# Patient Record
Sex: FEMALE | Race: WHITE | NOT HISPANIC OR LATINO | Employment: FULL TIME | URBAN - METROPOLITAN AREA
[De-identification: names, ages, dates, MRNs, and addresses within clinical notes are randomized per-mention and may not be internally consistent; named-entity substitution may affect disease eponyms.]

---

## 2024-07-30 ENCOUNTER — LAB REQUISITION (OUTPATIENT)
Dept: LAB | Facility: CLINIC | Age: 29
End: 2024-07-30
Payer: COMMERCIAL

## 2024-07-30 DIAGNOSIS — E06.3 AUTOIMMUNE THYROIDITIS: ICD-10-CM

## 2024-07-30 DIAGNOSIS — Z36.89 ENCOUNTER FOR OTHER SPECIFIED ANTENATAL SCREENING: ICD-10-CM

## 2024-07-30 LAB
ABO/RH(D): NORMAL
ANTIBODY SCREEN: NEGATIVE
BASOPHILS # BLD AUTO: 0 10E3/UL (ref 0–0.2)
BASOPHILS NFR BLD AUTO: 0 %
EOSINOPHIL # BLD AUTO: 0.1 10E3/UL (ref 0–0.7)
EOSINOPHIL NFR BLD AUTO: 1 %
ERYTHROCYTE [DISTWIDTH] IN BLOOD BY AUTOMATED COUNT: 12.6 % (ref 10–15)
HBA1C MFR BLD: 5.4 %
HBV SURFACE AG SERPL QL IA: NONREACTIVE
HCT VFR BLD AUTO: 42 % (ref 35–47)
HCV AB SERPL QL IA: NONREACTIVE
HGB BLD-MCNC: 14.2 G/DL (ref 11.7–15.7)
HIV 1+2 AB+HIV1 P24 AG SERPL QL IA: NONREACTIVE
IMM GRANULOCYTES # BLD: 0 10E3/UL
IMM GRANULOCYTES NFR BLD: 0 %
LYMPHOCYTES # BLD AUTO: 3.4 10E3/UL (ref 0.8–5.3)
LYMPHOCYTES NFR BLD AUTO: 34 %
MCH RBC QN AUTO: 31.7 PG (ref 26.5–33)
MCHC RBC AUTO-ENTMCNC: 33.8 G/DL (ref 31.5–36.5)
MCV RBC AUTO: 94 FL (ref 78–100)
MONOCYTES # BLD AUTO: 0.4 10E3/UL (ref 0–1.3)
MONOCYTES NFR BLD AUTO: 4 %
NEUTROPHILS # BLD AUTO: 6.1 10E3/UL (ref 1.6–8.3)
NEUTROPHILS NFR BLD AUTO: 61 %
NRBC # BLD AUTO: 0 10E3/UL
NRBC BLD AUTO-RTO: 0 /100
PLATELET # BLD AUTO: 222 10E3/UL (ref 150–450)
RBC # BLD AUTO: 4.48 10E6/UL (ref 3.8–5.2)
SPECIMEN EXPIRATION DATE: NORMAL
T4 FREE SERPL-MCNC: 1.49 NG/DL (ref 0.9–1.7)
TSH SERPL DL<=0.005 MIU/L-ACNC: 1.23 UIU/ML (ref 0.3–4.2)
WBC # BLD AUTO: 10.1 10E3/UL (ref 4–11)

## 2024-07-30 PROCEDURE — 83036 HEMOGLOBIN GLYCOSYLATED A1C: CPT | Mod: ORL | Performed by: NURSE PRACTITIONER

## 2024-07-30 PROCEDURE — 87086 URINE CULTURE/COLONY COUNT: CPT | Mod: ORL | Performed by: NURSE PRACTITIONER

## 2024-07-30 PROCEDURE — 84439 ASSAY OF FREE THYROXINE: CPT | Mod: ORL | Performed by: NURSE PRACTITIONER

## 2024-07-30 PROCEDURE — 80081 OBSTETRIC PANEL INC HIV TSTG: CPT | Mod: ORL | Performed by: NURSE PRACTITIONER

## 2024-07-30 PROCEDURE — 84443 ASSAY THYROID STIM HORMONE: CPT | Mod: ORL | Performed by: NURSE PRACTITIONER

## 2024-07-30 PROCEDURE — 86803 HEPATITIS C AB TEST: CPT | Mod: ORL | Performed by: NURSE PRACTITIONER

## 2024-07-31 LAB
RPR SER QL: NONREACTIVE
RUBV IGG SERPL QL IA: 2.25 INDEX
RUBV IGG SERPL QL IA: POSITIVE

## 2024-08-01 LAB — BACTERIA UR CULT: NO GROWTH

## 2024-09-23 ENCOUNTER — LAB REQUISITION (OUTPATIENT)
Dept: LAB | Facility: CLINIC | Age: 29
End: 2024-09-23
Payer: COMMERCIAL

## 2024-09-23 DIAGNOSIS — E06.3 AUTOIMMUNE THYROIDITIS: ICD-10-CM

## 2024-09-23 PROCEDURE — 84443 ASSAY THYROID STIM HORMONE: CPT | Mod: ORL | Performed by: ADVANCED PRACTICE MIDWIFE

## 2024-09-23 PROCEDURE — 84439 ASSAY OF FREE THYROXINE: CPT | Mod: ORL | Performed by: ADVANCED PRACTICE MIDWIFE

## 2024-09-24 LAB
T4 FREE SERPL-MCNC: 1.22 NG/DL (ref 0.9–1.7)
TSH SERPL DL<=0.005 MIU/L-ACNC: 1.52 UIU/ML (ref 0.3–4.2)

## 2024-10-24 ENCOUNTER — TRANSFERRED RECORDS (OUTPATIENT)
Dept: HEALTH INFORMATION MANAGEMENT | Facility: CLINIC | Age: 29
End: 2024-10-24
Payer: COMMERCIAL

## 2024-11-01 ENCOUNTER — TRANSFERRED RECORDS (OUTPATIENT)
Dept: HEALTH INFORMATION MANAGEMENT | Facility: CLINIC | Age: 29
End: 2024-11-01
Payer: COMMERCIAL

## 2024-11-01 ENCOUNTER — MEDICAL CORRESPONDENCE (OUTPATIENT)
Dept: HEALTH INFORMATION MANAGEMENT | Facility: CLINIC | Age: 29
End: 2024-11-01
Payer: COMMERCIAL

## 2024-11-01 ENCOUNTER — TRANSCRIBE ORDERS (OUTPATIENT)
Dept: MATERNAL FETAL MEDICINE | Facility: CLINIC | Age: 29
End: 2024-11-01
Payer: COMMERCIAL

## 2024-11-01 DIAGNOSIS — O26.90 PREGNANCY RELATED CONDITION, ANTEPARTUM: Primary | ICD-10-CM

## 2024-11-06 ENCOUNTER — PRE VISIT (OUTPATIENT)
Dept: MATERNAL FETAL MEDICINE | Facility: CLINIC | Age: 29
End: 2024-11-06
Payer: COMMERCIAL

## 2024-11-12 ENCOUNTER — HOSPITAL ENCOUNTER (OUTPATIENT)
Dept: ULTRASOUND IMAGING | Facility: CLINIC | Age: 29
Discharge: HOME | End: 2024-11-12
Attending: OBSTETRICS & GYNECOLOGY
Payer: COMMERCIAL

## 2024-11-12 ENCOUNTER — OFFICE VISIT (OUTPATIENT)
Dept: MATERNAL FETAL MEDICINE | Facility: CLINIC | Age: 29
End: 2024-11-12
Attending: OBSTETRICS & GYNECOLOGY
Payer: COMMERCIAL

## 2024-11-12 DIAGNOSIS — O35.BXX0 ECHOGENIC FOCUS OF HEART OF FETUS AFFECTING ANTEPARTUM CARE OF MOTHER, SINGLE OR UNSPECIFIED FETUS: Primary | ICD-10-CM

## 2024-11-12 DIAGNOSIS — O26.90 PREGNANCY RELATED CONDITION, ANTEPARTUM: ICD-10-CM

## 2024-11-12 PROCEDURE — 76811 OB US DETAILED SNGL FETUS: CPT

## 2024-11-12 NOTE — PROGRESS NOTES
"Please see \"Imaging\" tab under \"Chart Review\" for details of today's visit.    Andie Crawford    "

## 2024-11-12 NOTE — NURSING NOTE
Patient presents to [facility] for L2 US at 24w0d due to EIF noted on outside US. Positive fetal movement. Denies LOF, vaginal bleeding or cramping/contractions. SBAR given to PEDRO LUIS MD, see their note in Epic.

## 2024-12-12 ENCOUNTER — LAB REQUISITION (OUTPATIENT)
Dept: LAB | Facility: CLINIC | Age: 29
End: 2024-12-12
Payer: COMMERCIAL

## 2024-12-12 DIAGNOSIS — E03.9 HYPOTHYROIDISM, UNSPECIFIED: ICD-10-CM

## 2024-12-12 DIAGNOSIS — Z36.9 ENCOUNTER FOR ANTENATAL SCREENING, UNSPECIFIED: ICD-10-CM

## 2024-12-12 LAB
T PALLIDUM AB SER QL: NONREACTIVE
T4 FREE SERPL-MCNC: 0.99 NG/DL (ref 0.9–1.7)
TSH SERPL DL<=0.005 MIU/L-ACNC: 1.62 UIU/ML (ref 0.3–4.2)

## 2024-12-12 PROCEDURE — 86780 TREPONEMA PALLIDUM: CPT | Mod: ORL | Performed by: NURSE PRACTITIONER

## 2024-12-12 PROCEDURE — 84439 ASSAY OF FREE THYROXINE: CPT | Mod: ORL | Performed by: NURSE PRACTITIONER

## 2024-12-12 PROCEDURE — 84443 ASSAY THYROID STIM HORMONE: CPT | Mod: ORL | Performed by: NURSE PRACTITIONER

## 2025-04-11 ENCOUNTER — LAB REQUISITION (OUTPATIENT)
Dept: LAB | Facility: CLINIC | Age: 30
End: 2025-04-11
Payer: COMMERCIAL

## 2025-04-11 DIAGNOSIS — E03.9 HYPOTHYROIDISM, UNSPECIFIED: ICD-10-CM

## 2025-04-11 LAB
T4 FREE SERPL-MCNC: 1.6 NG/DL (ref 0.9–1.7)
TSH SERPL DL<=0.005 MIU/L-ACNC: 0.07 UIU/ML (ref 0.3–4.2)

## 2025-04-11 PROCEDURE — 84443 ASSAY THYROID STIM HORMONE: CPT | Mod: ORL | Performed by: DOULA

## 2025-04-11 PROCEDURE — 84439 ASSAY OF FREE THYROXINE: CPT | Mod: ORL | Performed by: DOULA

## 2025-06-08 ENCOUNTER — HEALTH MAINTENANCE LETTER (OUTPATIENT)
Age: 30
End: 2025-06-08